# Patient Record
Sex: MALE
[De-identification: names, ages, dates, MRNs, and addresses within clinical notes are randomized per-mention and may not be internally consistent; named-entity substitution may affect disease eponyms.]

---

## 2018-09-04 ENCOUNTER — HOSPITAL ENCOUNTER (EMERGENCY)
Dept: HOSPITAL 97 - ER | Age: 43
Discharge: HOME | End: 2018-09-04
Payer: COMMERCIAL

## 2018-09-04 DIAGNOSIS — R53.1: Primary | ICD-10-CM

## 2018-09-04 DIAGNOSIS — E88.40: ICD-10-CM

## 2018-09-04 LAB
ALBUMIN SERPL BCP-MCNC: 3.3 G/DL (ref 3.4–5)
ALP SERPL-CCNC: 105 U/L (ref 45–117)
ALT SERPL W P-5'-P-CCNC: 24 U/L (ref 12–78)
AST SERPL W P-5'-P-CCNC: 15 U/L (ref 15–37)
BUN BLD-MCNC: 17 MG/DL (ref 7–18)
GLUCOSE SERPLBLD-MCNC: 158 MG/DL (ref 74–106)
HCT VFR BLD CALC: 36.2 % (ref 39.6–49)
INR BLD: 1.13
LYMPHOCYTES # SPEC AUTO: 1.2 K/UL (ref 0.7–4.9)
MCH RBC QN AUTO: 29.7 PG (ref 27–35)
MCV RBC: 84.6 FL (ref 80–100)
METHAMPHET UR QL SCN: NEGATIVE
PMV BLD: 8.3 FL (ref 7.6–11.3)
POTASSIUM SERPL-SCNC: 3.8 MMOL/L (ref 3.5–5.1)
RBC # BLD: 4.28 M/UL (ref 4.33–5.43)
THC SERPL-MCNC: NEGATIVE NG/ML
UA COMPLETE W REFLEX CULTURE PNL UR: (no result)

## 2018-09-04 PROCEDURE — 36415 COLL VENOUS BLD VENIPUNCTURE: CPT

## 2018-09-04 PROCEDURE — 93005 ELECTROCARDIOGRAM TRACING: CPT

## 2018-09-04 PROCEDURE — 99284 EMERGENCY DEPT VISIT MOD MDM: CPT

## 2018-09-04 PROCEDURE — 85730 THROMBOPLASTIN TIME PARTIAL: CPT

## 2018-09-04 PROCEDURE — 80048 BASIC METABOLIC PNL TOTAL CA: CPT

## 2018-09-04 PROCEDURE — 81003 URINALYSIS AUTO W/O SCOPE: CPT

## 2018-09-04 PROCEDURE — 85610 PROTHROMBIN TIME: CPT

## 2018-09-04 PROCEDURE — 70450 CT HEAD/BRAIN W/O DYE: CPT

## 2018-09-04 PROCEDURE — 80307 DRUG TEST PRSMV CHEM ANLYZR: CPT

## 2018-09-04 PROCEDURE — 80329 ANALGESICS NON-OPIOID 1 OR 2: CPT

## 2018-09-04 PROCEDURE — 80320 DRUG SCREEN QUANTALCOHOLS: CPT

## 2018-09-04 PROCEDURE — 85025 COMPLETE CBC W/AUTO DIFF WBC: CPT

## 2018-09-04 PROCEDURE — 81015 MICROSCOPIC EXAM OF URINE: CPT

## 2018-09-04 PROCEDURE — 80076 HEPATIC FUNCTION PANEL: CPT

## 2018-09-04 NOTE — ER
Nurse's Notes                                                                                     

 [facility]                                                                

Name: Faustino Aburto                                                                                

Age: 42 yrs                                                                                       

Sex: Male                                                                                         

: 1975                                                                                   

MRN: [id number]                                                                                   

Arrival Date: 2018                                                                          

Time: 11:28                                                                                       

Account#: [id number]                                                                            

Bed 27                                                                                            

Private MD:                                                                                       

Diagnosis: Weakness                                                                               

                                                                                                  

Presentation:                                                                                     

                                                                                             

11:32 Presenting complaint: EMS states: Home health says he just wasn't himself earlier but   dm5 

      he is more back to baseline at this time. Always weak and has a hard time walking           

      normally. Family wants blood work done and states he needs fluids. Pt finishing liter       

      bolus started in EMS. Transition of care: patient was not received from another setting     

      of care. Onset of symptoms was 2018. Risk Assessment: Do you want to hurt     

      yourself or someone else? Patient reports no desire to harm self or others. Initial         

      Sepsis Screen: Does the patient meet any 2 criteria? No. Patient's initial sepsis           

      screen is negative. Does the patient have a suspected source of infection? No.              

      Patient's initial sepsis screen is negative. Care prior to arrival: IV initiated. 18        

      GA, in the left forearm.                                                                    

11:32 Method Of Arrival: EMS: [facility]                                                       5 

11:32 Acuity: MIGUEL 3                                                                           dm5 

                                                                                                  

Triage Assessment:                                                                                

11:39 General: Appears in no apparent distress. Behavior is calm, cooperative, baseline per   dm5 

      family. Pain: Denies pain. Unable to use pain scale. only answers yes questions but         

      does not appear to be in pain at this time. Neuro: Level of Consciousness is awake,         

      obeys commands, confused. Respiratory: Airway is patent Respiratory effort is even,         

      unlabored, relaxed, Respiratory pattern is regular, symmetrical. GI: family reports         

      diarrhea yesterday. Pt vomited throw up last night 1 time. Derm: Skin is pink, warm \T\     

      dry.                                                                                        

                                                                                                  

Historical:                                                                                       

- Allergies:                                                                                      

11:37 No Known Allergies;                                                                     dm5 

- Home Meds:                                                                                      

11:37 Carbamazepine Oral [Active]; glimepiride Oral [Active]; argenine [Active];              dm5 

11:38 carnitine [Active];                                                                     dm5 

- PMHx:                                                                                           

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 

- PSHx:                                                                                           

11:37 Appendectomy;                                                                           dm5 

                                                                                                  

- Immunization history:: Adult Immunizations up to date.                                          

- Social history:: Smoking status: Patient/guardian denies using tobacco.                         

- Ebola Screening: : Patient negative for fever greater than or equal to 101.5 degrees            

  Fahrenheit, and additional compatible Ebola Virus Disease symptoms Patient denies               

  exposure to infectious person Patient denies travel to an Ebola-affected area in the            

  21 days before illness onset No symptoms or risks identified at this time.                      

                                                                                                  

                                                                                                  

Screenin:34 Abuse screen: Denies threats or abuse. Denies injuries from another. Nutritional        aj  

      screening: No deficits noted. Tuberculosis screening: No symptoms or risk factors           

      identified. Fall Risk None identified.                                                      

                                                                                                  

Assessment:                                                                                       

12:33 General: Appears in no apparent distress. comfortable, Behavior is calm, cooperative,   aj  

      appropriate for age. Pain: Denies pain. Neuro: Level of Consciousness is awake, obeys       

      commands, Oriented to WNL for patient. Neuro:  are equal bilaterally Moves all         

      extremities. Speech is normal, Facial symmetry appears normal. Respiratory: Airway is       

      patent Respiratory effort is even, unlabored, Respiratory pattern is regular,               

      symmetrical. GI: No signs and/or symptoms were reported involving the gastrointestinal      

      system. Derm: Skin is intact, is healthy with good turgor, Skin is pink, warm \T\ dry.      

      normal.                                                                                     

14:35 Reassessment: Patient appears in no apparent distress at this time. No changes from     aj  

      previously documented assessment. Patient and/or family updated on plan of care and         

      expected duration. Pain level reassessed. Patient is alert, oriented x 3, equal             

      unlabored respirations, skin warm/dry/pink. Caregiver is at bedside Patient states          

      feeling better. Patient states symptoms have improved.                                      

                                                                                                  

Vital Signs:                                                                                      

11:39  / 72; Pulse 83; Resp 24; Temp 99(O); Pulse Ox 100% on 2 lpm NC; Weight 65.77 kg  dm5 

      (R); Height 5 ft. 6 in. (167.64 cm) (R); Pain 0/10;                                         

11:43 Pulse Ox 94% on R/A;                                                                    dm5 

13:12  / 75; Pulse 77; Resp 16; Pulse Ox 99% on R/A;                                    aj  

13:41  / 73; Pulse 73; Resp 16; Pulse Ox 99% on R/A;                                    aj  

14:35  / 73; Pulse 76; Resp 18; Pulse Ox 99% on R/A;                                    aj  

11:39 Body Mass Index 23.40 (65.77 kg, 167.64 cm)                                             dm5 

                                                                                                  

ED Course:                                                                                        

11:28 Patient arrived in ED.                                                                  ss  

11:28 Ashutosh Bower NP is PHCP.                                                           pm1 

11:28 Indra Holm MD is Attending Physician.                                             pm1 

11:34 Triage completed.                                                                       dm5 

11:43 Arm band placed on Patient placed in an exam room, on a stretcher, on cardiac monitor,  dm5 

      on pulse oximetry.                                                                          

12:00 Patient moved to CT via stretcher.                                                      sj  

12:05 CT Head Brain wo Cont In Process Unspecified.                                           EDMS

12:33 Mera Dick, RN is Primary Nurse.                                                     aj  

12:34 Patient has correct armband on for positive identification. Placed in gown. Bed in low  aj  

      position. Call light in reach. Side rails up X 1. Adult w/ patient.                         

12:52 EKG done, by EKG tech. reviewed by Ashutosh Bower NP.                                  3 

14:35 No provider procedures requiring assistance completed. IV discontinued, intact,         aj  

      bleeding controlled, No redness/swelling at site. Pressure dressing applied, EMS 18 G       

      to left AC.                                                                                 

                                                                                                  

Administered Medications:                                                                         

No medications were administered                                                                  

                                                                                                  

                                                                                                  

Outcome:                                                                                          

14:21 Discharge ordered by MD.                                                                pm1 

14:35 Discharged to home ambulatory, with family.                                             aj  

14:35 Condition: good                                                                             

14:35 Discharge instructions given to patient, family, Instructed on discharge instructions,      

      follow up and referral plans. Demonstrated understanding of instructions, follow-up         

      care.                                                                                       

14:37 Patient left the ED.                                                                    aj  

                                                                                                  

Signatures:                                                                                       

Dispatcher [employer]                           TRISTAMS                                                 

Iman Gonzalez RN RN   dmMera Aj RN RN aj Jones, Susan sj Smirch, Shelby, RN RN                                                      

Ashutosh Bower NP                    NP   pm1                                                  

Sakshi Patricio                              3                                                  

                                                                                                  

**************************************************************************************************

## 2018-09-04 NOTE — EDPHYS
Physician Documentation                                                                           

 [facility]                                                                

Name: Faustino Aburto                                                                                

Age: 42 yrs                                                                                       

Sex: Male                                                                                         

: 1975                                                                                   

MRN: [id number]                                                                                   

Arrival Date: 2018                                                                          

Time: 11:28                                                                                       

Account#: [id number]                                                                            

Bed 27                                                                                            

Private MD:                                                                                       

ED Physician Indra Holm                                                                      

HPI:                                                                                              

                                                                                             

14:00 This 42 yrs old  Male presents to ER via EMS with complaints of General         pm1 

      Weakness.                                                                                   

14:00 The patient presents to the emergency department with weakness of the entire body,      pm1 

      generalized weakness. Onset: The symptoms/episode began/occurred this morning. Context:     

      occurred at home, occurred while the patient was doing normal activity. Associated          

      signs and symptoms: Pertinent negatives: fever, headache, syncope, near-syncope.            

      Severity of symptoms: in the emergency department the symptoms. Patient's baseline:         

      Neuro: orientated to person, place, Motor: at his current generalized weakness              

      baseline, Ambulation: walks without assistance, The patient has a previous history of       

      mitochondrial disease. Current symptoms: Currently, the patient is not experiencing any     

      symptoms, the patient feels back to baseline, according to sister who lives with. The       

      patient has not recently seen a physician. 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      

      mitochondrial disease. Patient was not doing his normal routine according to his sister     

      and is currently acting within his normal limits. Patient has mild mental retardation,      

      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       

                                                                                                  

Historical:                                                                                       

- Allergies:                                                                                      

11:37 No Known Allergies;                                                                     dm5 

- Home Meds:                                                                                      

11:37 Carbamazepine Oral [Active]; glimepiride Oral [Active]; argenine [Active];              dm5 

11:38 carnitine [Active];                                                                     dm5 

- PMHx:                                                                                           

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 

- PSHx:                                                                                           

11:37 Appendectomy;                                                                           dm5 

                                                                                                  

- Immunization history:: Adult Immunizations up to date.                                          

- Social history:: Smoking status: Patient/guardian denies using tobacco.                         

- Ebola Screening: : Patient negative for fever greater than or equal to 101.5 degrees            

  Fahrenheit, and additional compatible Ebola Virus Disease symptoms Patient denies               

  exposure to infectious person Patient denies travel to an Ebola-affected area in the            

  21 days before illness onset No symptoms or risks identified at this time.                      

                                                                                                  

                                                                                                  

ROS:                                                                                              

14:00 Constitutional: Negative for fever, chills, and weight loss, Eyes: Negative for injury, pm1 

      pain, redness, and discharge, ENT: Negative for injury, pain, and discharge, Neck:          

      Negative for injury, pain, and swelling, Cardiovascular: Negative for chest pain,           

      palpitations, and edema, Respiratory: Negative for shortness of breath, cough,              

      wheezing, and pleuritic chest pain, Abdomen/GI: Negative for abdominal pain, nausea,        

      vomiting, diarrhea, and constipation, Back: Negative for injury and pain, : Negative      

      for injury, bleeding, discharge, and swelling, MS/Extremity: Negative for injury and        

      deformity, Skin: Negative for injury, rash, and discoloration.                              

14:00 Neuro: Positive for generalized weakness, Negative for altered mental status, headache,     

      loss of consciousness, seizure activity, syncope, near syncope.                             

                                                                                                  

Exam:                                                                                             

14:00 Constitutional:  This is a well developed, well nourished patient who is awake, alert,  pm1 

      and in no acute distress. Head/Face:  Normocephalic, atraumatic. Eyes:  Pupils equal        

      round and reactive to light, extra-ocular motions intact.  Lids and lashes normal.          

      Conjunctiva and sclera are non-icteric and not injected.  Cornea within normal limits.      

      Periorbital areas with no swelling, redness, or edema. ENT:  Nares patent. No nasal         

      discharge, no septal abnormalities noted.  Tympanic membranes are normal and external       

      auditory canals are clear.  Oropharynx with no redness, swelling, or masses, exudates,      

      or evidence of obstruction, uvula midline.  Mucous membranes moist. Neck:  Trachea          

      midline, no thyromegaly or masses palpated, and no cervical lymphadenopathy.  Supple,       

      full range of motion without nuchal rigidity, or vertebral point tenderness.  No            

      Meningismus. Chest/axilla:  Normal chest wall appearance and motion.  Nontender with no     

      deformity.  No lesions are appreciated. Cardiovascular:  Regular rate and rhythm with a     

      normal S1 and S2.  No gallops, murmurs, or rubs.  Normal PMI, no JVD.  No pulse             

      deficits. Respiratory:  Lungs have equal breath sounds bilaterally, clear to                

      auscultation and percussion.  No rales, rhonchi or wheezes noted.  No increased work of     

      breathing, no retractions or nasal flaring. Abdomen/GI:  Soft, non-tender, with normal      

      bowel sounds.  No distension or tympany.  No guarding or rebound.  No evidence of           

      tenderness throughout. Back:  No spinal tenderness.  No costovertebral tenderness.          

      Full range of motion. Skin:  Warm, dry with normal turgor.  Normal color with no            

      rashes, no lesions, and no evidence of cellulitis. MS/ Extremity:  Pulses equal, no         

      cyanosis.  Neurovascular intact.  Full, normal range of motion.                             

14:00 Neuro: Orientation: unable to test, mitochondrial disease, Mentation: baseline              

      according to sister at bedside, Motor: moves all fours, strength is 5/5 in all              

      extremities, Sensation: is normal, no obvious gross deficits.                               

                                                                                                  

Vital Signs:                                                                                      

11:39  / 72; Pulse 83; Resp 24; Temp 99(O); Pulse Ox 100% on 2 lpm NC; Weight 65.77 kg  dm5 

      (R); Height 5 ft. 6 in. (167.64 cm) (R); Pain 0/10;                                         

11:43 Pulse Ox 94% on R/A;                                                                    dm5 

13:12  / 75; Pulse 77; Resp 16; Pulse Ox 99% on R/A;                                    aj  

13:41  / 73; Pulse 73; Resp 16; Pulse Ox 99% on R/A;                                    aj  

14:35  / 73; Pulse 76; Resp 18; Pulse Ox 99% on R/A;                                    aj  

11:39 Body Mass Index 23.40 (65.77 kg, 167.64 cm)                                             dm5 

                                                                                                  

MDM:                                                                                              

11:28 Patient medically screened.                                                             kwan 

14:20 Data reviewed: vital signs. Data interpreted: Pulse oximetry: on room air is 99 %.      pm1 

      Interpretation: normal. Counseling: I had a detailed discussion with the patient and/or     

      guardian regarding: the historical points, exam findings, and any diagnostic results        

      supporting the discharge/admit diagnosis, lab results, radiology results, the need for      

      outpatient follow up, to return to the emergency department if symptoms worsen or           

      persist or if there are any questions or concerns that arise at home.                       

                                                                                                  

                                                                                             

11:39 Order name: Acetaminophen; Complete Time: 14:                                         pm1 

                                                                                             

11:39 Order name: Basic Metabolic Panel; Complete Time: 14:                                 pm                                                                                             

11:39 Order name: CBC with Diff; Complete Time: 14:                                         pm1 

                                                                                             

11:39 Order name: ETOH Level; Complete Time: 14:                                            pm1 

                                                                                             

11:39 Order name: Hepatic Function; Complete Time: 14:                                      pm                                                                                             

11:39 Order name: PT-INR; Complete Time: 14:09                                                pm1 

                                                                                             

11:35 Order name: CT Head Brain wo Cont; Complete Time: 12:22                                 pm1 

                                                                                             

11:39 Order name: Ptt, Activated; Complete Time: 14:09                                        pm                                                                                             

11:39 Order name: Salicylate; Complete Time: 14:09                                            pm1 

                                                                                             

11:39 Order name: Urine Drug Screen; Complete Time: 14:09                                     pm                                                                                             

11:39 Order name: EKG; Complete Time: 11:40                                                   pm1 

                                                                                             

11:39 Order name: EKG - Nurse/Tech; Complete Time: 13:26                                      pm1 

                                                                                             

11:39 Order name: Urine Microscopic Only; Complete Time: 14:09                                pm                                                                                             

13:21 Order name: Urine Dipstick--Ancillary (enter results); Complete Time: 14:09             eb  

                                                                                             

11:39 Order name: IV Saline Lock; Complete Time: 13:19                                        pm1 

                                                                                             

11:39 Order name: Labs collected and sent; Complete Time: 13:19                               pm1 

                                                                                             

11:39 Order name: Urine Dipstick-Ancillary (obtain specimen); Complete Time: 13:19            pm1 

                                                                                                  

Administered Medications:                                                                         

No medications were administered                                                                  

                                                                                                  

                                                                                                  

Disposition:                                                                                      

                                                                                             

07:18 Co-signature as Attending Physician, Indra Holm MD I agree with the assessment and  kwan 

      plan of care.                                                                               

                                                                                                  

Disposition:                                                                                      

18 14:21 Discharged to Home. Impression: Weakness.                                          

- Condition is Stable.                                                                            

- Discharge Instructions: Weakness.                                                               

                                                                                                  

- Medication Reconciliation Form, Thank You Letter, Antibiotic Education, Prescription            

  Opioid Use form.                                                                                

- Follow up: Emergency Department; When: As needed; Reason: Worsening of condition.               

  Follow up: Private Physician; When: 2 - 3 days; Reason: Recheck today's complaints,             

  Continuance of care, Re-evaluation by your physician.                                           

- Problem is new.                                                                                 

- Symptoms have improved.                                                                         

                                                                                                  

                                                                                                  

                                                                                                  

Signatures:                                                                                       

Dispatcher [employer]                           Iman Pfeiffer, Mera Mckeon RN, RN RN aj Anderson, Corey, MD MD cha Marinas, Patrick, NP                    NP   pm1                                                  

                                                                                                  

Corrections: (The following items were deleted from the chart)                                    

                                                                                             

14:37 14:21 2018 14:21 Discharged to Home. Impression: Weakness. Condition is Stable.   aj  

      Forms are Medication Reconciliation Form, Thank You Letter, Antibiotic Education,           

      Prescription Opioid Use. Follow up: Emergency Department; When: As needed; Reason:          

      Worsening of condition. Follow up: Private Physician; When: 2 - 3 days; Reason: Recheck     

      today's complaints, Continuance of care, Re-evaluation by your physician. Problem is        

      new. Symptoms have improved. pm1                                                            

                                                                                                  

**************************************************************************************************

## 2018-09-04 NOTE — RAD REPORT
EXAM DESCRIPTION:  CT - Head Brain Wo Cont - 9/4/2018 12:04 pm

 

CLINICAL HISTORY:  MENTAL STATUS CHANGE

Drowsiness

 

COMPARISON:  No comparisons

 

TECHNIQUE:  All CT scans are performed using dose optimization technique as appropriate and may inclu
de automated exposure control or mA/KV adjustment according to patient size.

 

FINDINGS:  No intracranial hemorrhage, hydrocephalus or extra-axial fluid collection.Large area of gl
iosis is seen in the left cerebral hemisphere likely related to prior infarction or trauma.Prominent 
brain atrophy pattern with chronic microvascular ischemic changes seen. No midline shift.

 

The paranasal sinuses and mastoids are clear. The calvarium is intact.

 

IMPRESSION:  No acute intracranial abnormality.

 

Prominent atrophy pattern noted, greater than typically seen in this age group.

## 2018-09-05 NOTE — EKG
Test Date:    2018-09-04               Test Time:    12:50:32

Technician:   BARRERA                                     

                                                     

MEASUREMENT RESULTS:                                       

Intervals:                                           

Rate:         77                                     

MT:           120                                    

QRSD:         84                                     

QT:           402                                    

QTc:          454                                    

Axis:                                                

P:            49                                     

MT:           120                                    

QRS:          47                                     

T:            152                                    

                                                     

INTERPRETIVE STATEMENTS:                                       

                                                     

Normal sinus rhythm

nst

Abnormal ECG

Compared to ECG 01/17/2008 20:00:45



Sinus tachycardia no longer present

T-wave abnormality still present



Electronically Signed On 09-05-18 04:17:27 CDT by Chava Osullivan

## 2022-06-22 ENCOUNTER — HOSPITAL ENCOUNTER (INPATIENT)
Dept: HOSPITAL 97 - ER | Age: 47
LOS: 6 days | Discharge: HOME HEALTH | DRG: 177 | End: 2022-06-28
Attending: INTERNAL MEDICINE | Admitting: INTERNAL MEDICINE
Payer: COMMERCIAL

## 2022-06-22 VITALS — BODY MASS INDEX: 24.2 KG/M2

## 2022-06-22 DIAGNOSIS — Z86.73: ICD-10-CM

## 2022-06-22 DIAGNOSIS — Z78.1: ICD-10-CM

## 2022-06-22 DIAGNOSIS — R47.01: ICD-10-CM

## 2022-06-22 DIAGNOSIS — U07.1: Primary | ICD-10-CM

## 2022-06-22 DIAGNOSIS — G93.41: ICD-10-CM

## 2022-06-22 DIAGNOSIS — G40.909: ICD-10-CM

## 2022-06-22 DIAGNOSIS — E88.41: ICD-10-CM

## 2022-06-22 DIAGNOSIS — E87.6: ICD-10-CM

## 2022-06-22 LAB
ALBUMIN SERPL BCP-MCNC: 3.6 G/DL (ref 3.4–5)
ALP SERPL-CCNC: 104 U/L (ref 45–117)
ALT SERPL W P-5'-P-CCNC: 19 U/L (ref 12–78)
AST SERPL W P-5'-P-CCNC: 12 U/L (ref 15–37)
BUN BLD-MCNC: 16 MG/DL (ref 7–18)
GLUCOSE SERPLBLD-MCNC: 116 MG/DL (ref 74–106)
HCT VFR BLD CALC: 37.4 % (ref 39.6–49)
INR BLD: 1.12
LYMPHOCYTES # SPEC AUTO: 1.6 K/UL (ref 0.7–4.9)
MCV RBC: 85 FL (ref 80–100)
METHAMPHET UR QL SCN: NEGATIVE
PMV BLD: 7.3 FL (ref 7.6–11.3)
POTASSIUM SERPL-SCNC: 4.2 MMOL/L (ref 3.5–5.1)
RBC # BLD: 4.39 M/UL (ref 4.33–5.43)
THC SERPL-MCNC: NEGATIVE NG/ML
TSH SERPL DL<=0.05 MIU/L-ACNC: 1.22 UIU/ML (ref 0.36–3.74)

## 2022-06-22 PROCEDURE — 80053 COMPREHEN METABOLIC PANEL: CPT

## 2022-06-22 PROCEDURE — 71045 X-RAY EXAM CHEST 1 VIEW: CPT

## 2022-06-22 PROCEDURE — 99285 EMERGENCY DEPT VISIT HI MDM: CPT

## 2022-06-22 PROCEDURE — 82550 ASSAY OF CK (CPK): CPT

## 2022-06-22 PROCEDURE — 93005 ELECTROCARDIOGRAM TRACING: CPT

## 2022-06-22 PROCEDURE — 80061 LIPID PANEL: CPT

## 2022-06-22 PROCEDURE — 97165 OT EVAL LOW COMPLEX 30 MIN: CPT

## 2022-06-22 PROCEDURE — 70551 MRI BRAIN STEM W/O DYE: CPT

## 2022-06-22 PROCEDURE — 81003 URINALYSIS AUTO W/O SCOPE: CPT

## 2022-06-22 PROCEDURE — 97116 GAIT TRAINING THERAPY: CPT

## 2022-06-22 PROCEDURE — 85610 PROTHROMBIN TIME: CPT

## 2022-06-22 PROCEDURE — 82947 ASSAY GLUCOSE BLOOD QUANT: CPT

## 2022-06-22 PROCEDURE — 80307 DRUG TEST PRSMV CHEM ANLYZR: CPT

## 2022-06-22 PROCEDURE — 84146 ASSAY OF PROLACTIN: CPT

## 2022-06-22 PROCEDURE — 80048 BASIC METABOLIC PNL TOTAL CA: CPT

## 2022-06-22 PROCEDURE — 83605 ASSAY OF LACTIC ACID: CPT

## 2022-06-22 PROCEDURE — 97161 PT EVAL LOW COMPLEX 20 MIN: CPT

## 2022-06-22 PROCEDURE — 84443 ASSAY THYROID STIM HORMONE: CPT

## 2022-06-22 PROCEDURE — 85730 THROMBOPLASTIN TIME PARTIAL: CPT

## 2022-06-22 PROCEDURE — 36415 COLL VENOUS BLD VENIPUNCTURE: CPT

## 2022-06-22 PROCEDURE — 93971 EXTREMITY STUDY: CPT

## 2022-06-22 PROCEDURE — 80156 ASSAY CARBAMAZEPINE TOTAL: CPT

## 2022-06-22 PROCEDURE — 85025 COMPLETE CBC W/AUTO DIFF WBC: CPT

## 2022-06-22 PROCEDURE — 97530 THERAPEUTIC ACTIVITIES: CPT

## 2022-06-22 PROCEDURE — 80076 HEPATIC FUNCTION PANEL: CPT

## 2022-06-22 PROCEDURE — 83090 ASSAY OF HOMOCYSTEINE: CPT

## 2022-06-22 PROCEDURE — 70450 CT HEAD/BRAIN W/O DYE: CPT

## 2022-06-22 RX ADMIN — DEXTROSE AND SODIUM CHLORIDE SCH MLS: 5; .9 INJECTION, SOLUTION INTRAVENOUS at 21:57

## 2022-06-22 RX ADMIN — Medication SCH ML: at 21:57

## 2022-06-22 NOTE — EDPHYS
Physician Documentation                                                                           

 [facility]                                                                 

Name: Faustino Aburto                                                                                

Age: 46 yrs                                                                                       

Sex: Male                                                                                         

: 1975                                                                                   

MRN: [id number]                                                                                   

Arrival Date: 2022                                                                          

Time: 13:42                                                                                       

Account#: [id number]                                                                            

Bed 3                                                                                             

Private MD:                                                                                       

ED Physician Yung Hernandez                                                                         

HPI:                                                                                              

                                                                                             

18:43 This 46 yrs old  Male presents to ER via EMS with complaints of Altered Mental  rn  

      Status, Slurred Speech.                                                                     

18:43 The patient presents with confusion. Onset: The symptoms/episode began/occurred at an   rn  

      unknown time. Possible causes: unknown. Current symptoms: In the emergency department       

      the patient's symptoms are unchanged from the initial presentation. The patient has         

      experienced a previous episode. The patient has not recently seen a physician. EMS          

      reports last known normal last night, family saw him this AM and was not at baseline,       

      seemed confused, more slurred speech than normal, agitated. NO seizures. Has MELAS.         

      Takes carbamazepine for seizures. .                                                         

                                                                                                  

Historical:                                                                                       

- Allergies:                                                                                      

13:42 No Known Allergies;                                                                     aa5 

- PMHx:                                                                                           

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 

13:54 MELAS;                                                                                  aa5 

14:00 Expressive and receptive aphasia;                                                       aa5 

                                                                                                  

- Immunization history:: Adult Immunizations unknown.                                             

- Social history:: Smoking status: unknown.                                                       

- Family history:: not pertinent.                                                                 

- Hospitalizations: : No recent hospitalization is reported.                                      

                                                                                                  

                                                                                                  

ROS:                                                                                              

18:43 Unable to obtain ROS due to altered mental status.                                      rn  

                                                                                                  

Exam:                                                                                             

18:43 Constitutional:  This is a well developed, well nourished patient who is awake, alert,  rn  

      slight agitation Head/Face:  Normocephalic, atraumatic. Eyes:  Periorbital areas with       

      no swelling, redness, or edema. ENT:  dry MM Cardiovascular:  Regular rate and rhythm.      

      No pulse deficits. Respiratory:  No increased work of breathing, no retractions or          

      nasal flaring. Abdomen/GI:  Soft, non-tender Skin:  Warm, dry MS/ Extremity:  Pulses        

      equal, no cyanosis.   Neuro:  Awake alert, moderate slurred speech, moves all 4             

      extremities with equal strength and to command. Oriented to person, not place or time,      

      repetetive speech.                                                                          

                                                                                                  

Vital Signs:                                                                                      

13:42  / 83; Pulse 74; Resp 16; Temp 98.4(A); Pulse Ox 100% ;                           aa5 

15:00  / 78; Pulse 74; Resp 18 S; Temp 98.3(A); Pulse Ox 100% on R/A;                   aa5 

16:30  / 85; Pulse 74; Resp 16 S; Pulse Ox 99% on R/A;                                  aa5 

19:16  / 96; Pulse 73; Resp 18 S; Pulse Ox 100% on R/A;                                 lg3 

                                                                                                  

NIH Stroke Scale Scores:                                                                          

13:42 NIHSS Score: 9                                                                          aa5 

                                                                                                  

MDM:                                                                                              

13:42 Patient medically screened.                                                             rn  

                                                                                                  

                                                                                             

13:44 Order name: Basic Metabolic Panel; Complete Time: 15:32                                 rn  

                                                                                             

13:44 Order name: CBC with Diff; Complete Time: 14:30                                         rn  

                                                                                             

13:44 Order name: CPK; Complete Time: 15:32                                                   rn  

                                                                                             

13:44 Order name: Hepatic Function; Complete Time: 15:32                                      rn  

                                                                                             

13:44 Order name: Protime (+inr); Complete Time: 14:49                                        rn  

                                                                                             

13:44 Order name: Ptt, Activated; Complete Time: 14:49                                        rn  

                                                                                             

13:44 Order name: Lactate; Complete Time: 15:32                                               rn  

                                                                                             

14:29 Order name: Glucose, Ancillary Testing; Complete Time: 14:30                            [facility]

                                                                                             

14:55 Order name: SARS-COV-2 RT PCR (Document "Date of Onset" if Symptomatic)                   

                                                                                             

15:35 Order name: Urine Drug Screen                                                           rn  

                                                                                             

16:51 Order name: Prolactin                                                                   EDMS

                                                                                             

16:51 Order name: Creatine Phosphokinase                                                      EDMS

                                                                                             

16:51 Order name: Homocysteine                                                                EDMS

                                                                                             

16:51 Order name: Thyroid Stimulating Hormone                                                 EDMS

                                                                                             

13:44 Order name: CT Stroke Brain w/o Contrast; Complete Time: 14:30                          rn  

                                                                                             

13:44 Order name: Stroke CXR 1 View; Complete Time: 14:49                                     rn  

                                                                                             

16:51 Order name: CBC with Automated Diff                                                     EDMS

                                                                                             

16:51 Order name: CBC with Automated Diff                                                     EDMS

                                                                                             

16:51 Order name: Comprehensive Metabolic Panel                                               EDMS

                                                                                             

16:51 Order name: Comprehensive Metabolic Panel                                               EDMS

                                                                                             

16:51 Order name: Comprehensive Metabolic Panel                                               EDMS

                                                                                             

16:51 Order name: Comprehensive Metabolic Panel                                               EDMS

                                                                                             

16:51 Order name: Lipid Profile                                                               EDMS

                                                                                             

16:51 Order name: Lipid Profile                                                               EDMS

                                                                                             

16:51 Order name: Brain Wo Cont                                                               EDMS

                                                                                             

16:53 Order name: Carbamazepine (Tegretol) Level                                              EDMS

                                                                                             

19:02 Order name: Urine Dipstick-Ancillary                                                    EDMS

                                                                                             

19:43 Order name: Lactate Sepsis 2 HR Follow-up                                               EDMS

                                                                                             

13:44 Order name: EKG; Complete Time: 13:44                                                   rn  

                                                                                             

13:44 Order name: Accucheck; Complete Time: 14:30                                             rn  

                                                                                             

13:44 Order name: Cardiac monitoring; Complete Time: 14:30                                    rn  

                                                                                             

13:44 Order name: EKG - Nurse/Tech; Complete Time: 14:30                                      rn  

                                                                                             

13:44 Order name: IV Saline Lock; Complete Time: 14:30                                        rn  

                                                                                             

13:44 Order name: Labs collected and sent; Complete Time: 14:30                               rn  

                                                                                             

13:44 Order name: NPO; Complete Time: 13:47                                                   rn  

                                                                                             

13:44 Order name: O2 Per Protocol; Complete Time: 13:46                                       rn  

                                                                                             

13:44 Order name: O2 Sat Monitoring; Complete Time: 13:46                                     rn  

                                                                                             

13:44 Order name: Stroke Swallow Screen; Complete Time: 15:01                                 rn  

                                                                                             

16:51 Order name: CONS Physician Consult                                                      EDMS

                                                                                             

16:51 Order name: Clear Liquid; Complete Time: 19:16                                          EDMS

                                                                                                  

Administered Medications:                                                                         

14:45 Drug: NS 0.9% 1000 ml Route: IV; Rate: 1000 ml; Site: right antecubital;                aa5 

15:31 Drug: NS 0.9% 1000 ml Route: IV; Rate: 1000 ml; Site: right antecubital;                aa5 

                                                                                                  

                                                                                                  

Point of Care Testing:                                                                            

      Blood Glucose:                                                                              

14:20 Blood Glucose: 123 mg/dL;                                                               aa5 

      Ranges:                                                                                     

      Critical Glucose Levels:Adult <50 mg/dl or >400 mg/dl  <40 mg/dl or >180 mg/dl       

Disposition Summary:                                                                              

22 15:36                                                                                    

Hospitalization Ordered                                                                           

      Hospitalization Status: Inpatient Admission                                             rn  

      Provider: Krysta Graham                                                               rn  

      Location: Tele[facility] (Inpatient)                                                 rn  

      Condition: Stable                                                                       rn  

      Problem: new                                                                            rn  

      Symptoms: are unchanged                                                                 rn  

      Bed/Room Type: Standard                                                                 rn  

      Room Assignment: 223(22 18:19)                                                    bd  

      Diagnosis                                                                                   

        - Altered mental status, unspecified                                                  [facility]                                                                               rn  

      Forms:                                                                                      

        - Medication Reconciliation Form                                                      rn  

        - SBAR form                                                                           rn  

                                                                                                  

NIH Stroke Scale - NIH Stroke Score                                                               

Date: 2022                                                                                  

Time: 13:42                                                                                       

Total Score = 9                                                                                   

  1a. Level of Consciousness (LOC) - 0(Alert)                                                     

  1b. Level of Consciousness (LOC) (Month \T\ Age) - 2(Neither)                                   

  1c. LOC Commands (Open \T\ Closes Eyes/) - 2(Neither)                                       

   2. Best Gaze (Lateral Gaze Paresis) - 0(Normal)                                                

   3. Visual Field Loss - 0(No visual loss)                                                       

   4. Facial Palsy - 0(Normal)                                                                    

  5a. Left Arm: Motor (10-second hold) - 0(No drift)                                              

  5b. Right Arm: Motor (10-second hold) - 1(Drift)                                                

  6a. Left Leg: Motor (5-second hold - always test supine) - 0(No drift)                          

  6b. Right Leg: Motor (5-second hold - always test supine) - 0(No drift)                         

   7. Limb Ataxia (finger/nose \T\ heel/shin - test with eyes open) - 0(Absent)                   

   8. Sensory Loss (pinprick arms/legs/face) - 0(Normal)                                          

   9. Best Language: Aphasia (description/naming/reading) - 2(Severe aphasia)                     

  10. Dysarthria (speech clarity - read or repeat words) - 2(Severe)                              

  11. Extinction and Inattention (visual/tactile/auditory/spatial/personal) - 0(No                

      abnormality)                                                                                

Initials: aa5                                                                                     

                                                                                                  

Signatures:                                                                                       

Dispatcher [employer]                           Cha Anderson Roman, MD MD rn Calderon, Audri, RN RN   aa5                                                  

                                                                                                  

Corrections: (The following items were deleted from the chart)                                    

13:54 13:42 PMHx: JESSICA; fazal                                                          aa5         

18:19 15:36 rn                                                                        precious          

                                                                                                  

**************************************************************************************************

## 2022-06-22 NOTE — P.HP
Certification for Inpatient


Patient will require the following post-hospital care: None


Practitioner: I am a practitioner with admitting privileges, knowledge of 

patient current condition, hospital course, and medical plan of care.


Services: Services provided to patient in accordance with Admission requirements

found in Title 42 Section 412.3 of the Code of Federal Regulations





Patient History


Date of Service: 06/23/22


Reason for admission: Altered mental status


History of Present Illness: 





46-year-old male with past medical history of MELAS syndrome, seizure disorder, 

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 

onset confusion this morning.  Patient who reside with his niece was noted by 

the niece to be having difficulty with speaking on waking up this morning.  

Patient was also dropping things and having disorganized movements which is 

unlike his baseline.  Niece reports that patient did have some weight behavior 

yesterday but he typically gets tired.  She has brought him to the ED today 

because of worsening confusion.  She reports at baseline he is able to do all 

activities of daily living as well as instrumental activities of daily living 

without any assist.  On presentation in the ED patient had normal blood 

pressure, he was noted to have both expressive and receptive aphasia.  Head CT 

shows remote old left MCA infarct but otherwise no acute intracranial pathology.

 His WBC and his BMP were normal.  He had mild elevated lactic acid of 2.4.  


At the time of interview patient is unable to give any history.  He has some 

poor words vocalization but unable to follow commands.  Niece present at bedside

helping with history.  No reported new fever or chills.  No reported new 

contact.  No new diarrhea chest pain or dizziness


Allergies





iodine Allergy (Verified 03/05/13 20:47)


   Hives


phenytoin sodium [From Dilantin] Allergy (Verified 03/05/13 20:47)


   Hives


phenytoin sodium extended [From Dilantin] Allergy (Verified 03/05/13 20:47)


   Hives








- Past Medical/Surgical History


-: Multiple CVAs2 episode in his 30s, last episode was 4 years ago


-: Seizure disorder, none since the last 5 years


-: None





- Family History


  ** Father


-: Other (see notes)


Notes: Strong family history of MELAS syndrome with multiple members of the 

family with CVA





- Social History


Smoking Status: Never smoker


Smoking therapy provided: No


Alcohol use: No


CD- Drugs: No


Caffeine use: No


Place of Residence: Home





Review of Systems


is unable to be obtained





Physical Examination





- Physical Exam


General: Alert, Confused, Delirious, Other (confused and moving )


HEENT: Atraumatic, Normocephalic


Neck: Supple, 2+ carotid pulse no bruit, JVD not distended


Respiratory: Clear to auscultation bilaterally, Normal air movement


Cardiovascular: Normal pulses, Regular rate/rhythm, Normal S1 S2


Gastrointestinal: Normal bowel sounds, Soft and benign, Non-distended


Musculoskeletal: No clubbing, No swelling, No contractures


Integumentary: No rashes, No breakdown


Neurological: Normal tone, Abnormal speech, Abnormal cranial nerve function





- Studies


Laboratory Data (last 24 hrs)





06/22/22 14:14: PT 12.4, INR 1.12, APTT 33.5


06/22/22 14:14: WBC 9.5, Hgb 12.3 L, Hct 37.4 L, Plt Count 199


06/22/22 14:14: Sodium 137, Potassium 4.2, BUN 16, Creatinine 0.68, Glucose 116 

H, Total Bilirubin 0.2, AST 12 L, ALT 19, Alkaline Phosphatase 104








Assessment and Plan





- Problems (Diagnosis)


(1) MELAS (mitochondrial encephalopathy, lactic acidosis and stroke-like 

episodes)


Current Visit: Yes   Status: Acute   


Discharge Plan: Home


Plan to discharge in: 48 Hours





- Advance Directives


Does patient have a Living Will: No


Does patient have a Durable POA for Healthcare: Yes





- Code Status/Comfort Care


Code Status Assessed: Yes


Code Status: Full Code


Physician Review: Patient Assessed, Agree with Above Assessment and Plan


Physician Review Additional Text: 





Impression


Acute metabolic encephalopathy


Rule out new CVA


History of MELAS syndrome


Elevated lactic aciddue to Syndrome








Plan


We will admit patient to observation


Start gentle IV fluid hydration


We will start some sedative with Ativan to control patient agitation and 

confused status


Formal neurology consult


Obtain blood culture x2 although likely mild elevated lactic acid may be due to 

MELAS syndrome


As needed Geodon as needed


MRI brain in a.m.


Obtain urine drug screen


Obtain carbamazepine levelneeds report patient is on carbamazepine


May need echo and carotid Doppler if evidence of new CVA


Will monitor closely


Neurochecks every 4


Time Spent Managing Pts Care (In Minutes): 70

## 2022-06-22 NOTE — RAD REPORT
EXAM DESCRIPTION:  CT - Ct Stroke Brain Wo Cont - 6/22/2022 2:06 pm

 

CLINICAL HISTORY:  MELAS, AMS

 

COMPARISON:  Head Brain Wo Cont dated 9/4/2018

 

TECHNIQUE:  All CT scans are performed using dose optimization technique as appropriate and may inclu
de automated exposure control or mA/KV adjustment according to patient size.

 

FINDINGS:  No intracranial hemorrhage, hydrocephalus or extra-axial fluid collection.No areas of brai
n edema or evidence of midline shift. Large remote left MCA territory infarct. Age advanced cerebral 
atrophy. Chronic small vessel ischemic changes. Bilateral basal ganglia mineralization.

 

Trace maxillary sinus thickening. The calvarium is intact.

 

IMPRESSION:  No acute intracranial abnormality.  Large remote left MCA territory infarct.

## 2022-06-22 NOTE — ER
Nurse's Notes                                                                                     

 [facility] [place]                                                                 

Name: Faustino Aburto                                                                                

Age: 46 yrs                                                                                       

Sex: Male                                                                                         

: 1975                                                                                   

MRN: [id number]                                                                                   

Arrival Date: 2022                                                                          

Time: 13:42                                                                                       

Account#: [id number]                                                                            

Bed 3                                                                                             

Private MD:                                                                                       

Diagnosis: Altered mental status, unspecified;MELAS                                               

                                                                                                  

Presentation:                                                                                     

                                                                                             

13:42 Coronavirus screen: At this time, the client does not indicate any symptoms associated  aa5 

      with coronavirus-19. Ebola Screen: Patient denies travel to an Ebola-affected area in       

      the 21 days before illness onset. Initial Sepsis Screen: Does the patient meet any 2        

      criteria? No. Patient's initial sepsis screen is negative. Does the patient have a          

      suspected source of infection? No. Patient's initial sepsis screen is negative. Risk        

      Assessment: Do you want to hurt yourself or someone else? Unable to obtain. Onset of        

      symptoms was 2022.                                                                 

13:42 Acuity: MIGUEL 2                                                                           aa5 

13:42 Method Of Arrival: EMS: [facility]                                                       aa5 

13:42 Chief complaint: EMS states: family reported speech difficulty, pt is normally able to  aa5 

      speak short sentences with some aphasia. Last known normal was last night.                  

13:42 An acute neurological deficit is present. Pre-hospital glucose is not applicable to     aa5 

      this patient.                                                                               

                                                                                                  

Triage Assessment:                                                                                

13:42 The onset of the patients symptoms was more than six hours ago.                         aa5 

                                                                                                  

Stroke Activation: Symptom onset > 6 hours                                                        

 Physician: Stroke Attending; Name: ; Notified At: ; Arrived At:                                  

 Physician: Chief Stroke Resident; Name: ; Notified At: ; Arrived At:                             

 Physician: Stroke Resident; Name: ; Notified At: ; Arrived At:                                   

 Physician: ED Attending; Name: ; Notified At: ; Arrived At:                                      

 Physician: ED Resident; Name: ; Notified At: ; Arrived At:                                       

                                                                                                  

Historical:                                                                                       

- Allergies:                                                                                      

13:42 No Known Allergies;                                                                     aa5 

- PMHx:                                                                                           

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 

13:54 MELAS;                                                                                  aa5 

14:00 Expressive and receptive aphasia;                                                       aa5 

                                                                                                  

- Immunization history:: Adult Immunizations unknown.                                             

- Social history:: Smoking status: unknown.                                                       

- Family history:: not pertinent.                                                                 

- Hospitalizations: : No recent hospitalization is reported.                                      

                                                                                                  

                                                                                                  

Screenin:36 Abuse screen: No signs of abuse noted. Nutritional screening: No deficits noted.        aa5 

      Tuberculosis screening: No symptoms or risk factors identified. Fall Risk Secondary         

      diagnosis (15 points) CVA, IV access (20 points). Mental Status- Overestimates/Forgets      

      Limitations (15 pts.). Total Gonzales Fall Scale indicates High Risk Score (45 or more         

      points). Fall prevention measures have been instituted. Side Rails Up X 2 Placed Close      

      to Nursing Station Family Present and informed to notify staff if the need to leave the     

      bedside.                                                                                    

                                                                                                  

Assessment:                                                                                       

13:42 General: Appears comfortable, Behavior is calm, cooperative. Pain: Unable to use pain   aa5 

      scale. FLACC scale score is 0 out of 10. Neuro: Level of Consciousness is awake,            

      confused, Pt only able to follow some commands . Oriented to person,  are unknown,     

      pt unable to follow command to . . Weakness in right arm(s) Speech is slurred, with     

      expressive aphasia noted, Facial symmetry appears normal, PERRL. Cardiovascular: Heart      

      tones S1 S2 present Rhythm is regular. Respiratory: Airway is patent Respiratory effort     

      is even, unlabored, Respiratory pattern is regular, symmetrical, Breath sounds are          

      clear bilaterally. GI: Abdomen is round non-distended, Bowel sounds present X 4 quads.      

      Abd is soft X 4 quads. : No signs and/or symptoms were reported regarding the             

      genitourinary system. EENT: hearing aid noted to left ear . Derm: Skin is dry, Skin is      

      normal, Skin temperature is warm. Musculoskeletal: Range of motion: intact in all           

      extremities.                                                                                

13:42 VAN Scoring: Arm Drift: Minor drift Visual Disturbance: No visual disturbance noted.    aa5 

      Aphasia: Patient exhibits both expressive and receptive aphasia. Provider notified of       

      +VAN scoring. Neglect: No neglect noted.                                                    

13:42 T-PA (Activase) Screening: Contraindications: Patient reports onset of signs and        aa5 

      symptoms of stroke greater than 6 hours ago: Yes.                                           

13:42 Patient has been NPO before screening. The patient is alert, and able to follow         aa5 

      commands. The patient exhibits slurred or garbled speech. Provider notified of the          

      indication for Speech Therapy consult. The patient failed the bedside swallow               

      screening. The patient will be kept NPO until cleared by Speech Therapy or Physician.       

      Provider notified of bedside swallow screening results: Yung Hernandez MD.                     

13:42 The patient is exhibiting difficulty speaking. The patient is exhibiting difficulty     aa5 

      understanding words. The patient is able to swallow own secretions with no drooling or      

      need for suction. not completed not completed.                                              

14:00 Reassessment: Pt's niece (caretaker) at bedside. Pt's niece reports slurred speech and  aa5 

      states "he was dropping things this morning and he normally doesn't do that, he is          

      right handed and dropped his coffee cup this morning" .                                     

14:10 Reassessment: Back from CT, x-ray at bedside .                                          aa5 

14:45 Reassessment: Pt not responding to name, pt with eyes open staring into space, right    aa5 

      side of face twitching and mild facial droop noted, MD was notified and episode             

      witnessed by physician, MD currently speaking to pt's niece (caregiver). Episode lasted     

      approximately 30 to 45 seconds. Pt now awake and responding to name, slurred speech,        

      receptive/expressive aphasia noted, right arm weakness and drift, no facial droop noted     

      at this time. .                                                                             

15:00 Reassessment: Pt sitting up in bed. . Neuro: Level of Consciousness is awake, Oriented  aa5 

      to person,  are unknown, pt not following commands to  . Weakness in right         

      arm(s) Speech is slurred, with expressive aphasia noted, Facial symmetry appears            

      normal, PERRL. Cardiovascular: Rhythm is sinus rhythm. Respiratory: Airway is patent        

      Respiratory effort is even, unlabored, Respiratory pattern is regular, symmetrical.         

      Derm: Skin is dry, Skin is normal, Skin temperature is warm.                                

15:30 Reassessment: Pt resting in bed with eyes closed, respirations even and unlabored. .    aa5 

16:30 Reassessment: Pt resting in bed with eyes closed, easy to awaken to verbal stimuli.     aa5 

      Speech remains incomprehensible and slurred. Awaiting room assignment. .                    

17:30 Reassessment: Pt resting in bed with eyes closed, easy to awaken to verbal stimuli.     aa5 

      Cleaned of urinary incontinence. .                                                          

19:17 General: Appears in no apparent distress. comfortable, Behavior is calm, cooperative.   lg3 

      Pain: Unable to use pain scale. FLACC scale score is 0 out of 10. Neuro: Level of           

      Consciousness is awake, confused, Oriented to person, Weakness Speech is slurred, with      

      expressive aphasia noted, Facial symmetry appears normal. Cardiovascular: No deficits       

      noted. Heart tones S1 S2 present Rhythm is sinus rhythm. Respiratory: No deficits           

      noted. Airway is patent Respiratory effort is even, unlabored, Respiratory pattern is       

      regular, symmetrical. GI: No deficits noted. Abdomen is round non-distended, Bowel          

      sounds present X 4 quads. Abd is soft X 4 quads. : No signs and/or symptoms were          

      reported regarding the genitourinary system. EENT: hearing aid noted to left ear. Derm:     

      No deficits noted. Skin is dry, Skin is normal, Skin temperature is warm.                   

      Musculoskeletal: Range of motion: intact in all extremities.                                

20:15 Reassessment: Pt taken to MRI via stretcher, will go to 2nd floor after MRI is complete.vc1 

                                                                                                  

Vital Signs:                                                                                      

13:42  / 83; Pulse 74; Resp 16; Temp 98.4(A); Pulse Ox 100% ;                           aa5 

15:00  / 78; Pulse 74; Resp 18 S; Temp 98.3(A); Pulse Ox 100% on R/A;                   aa5 

16:30  / 85; Pulse 74; Resp 16 S; Pulse Ox 99% on R/A;                                  aa5 

19:16  / 96; Pulse 73; Resp 18 S; Pulse Ox 100% on R/A;                                 lg3 

                                                                                                  

NIH Stroke Scale Scores:                                                                          

13:42 NIHSS Score: 9                                                                          aa5 

                                                                                                  

ED Course:                                                                                        

13:42 Patient arrived in ED.                                                                  rn  

13:42 Yung Hernandez MD is Attending Physician.                                                rn  

13:42 Arm band placed on.                                                                     aa5 

13:42 Patient has correct armband on for positive identification. Placed in gown. Bed in low  aa5 

      position. Call light in reach. Side rails up X2. Client placed on continuous cardiac        

      and pulse oximetry monitoring. NIBP monitoring applied.                                     

13:46 Megha Chavez, RN is Primary Nurse.                                                   aa5 

13:48 Triage completed.                                                                       aa5 

14:07 CT Stroke Brain w/o Contrast In Process Unspecified.                                    EDMS

14:15 Initial lab(s) drawn, by me, sent to lab. Inserted saline lock: 20 gauge in right       aa5 

      antecubital area, using aseptic technique. Blood collected.                                 

14:22 Stroke CXR 1 View In Process Unspecified.                                               EDMS

15:35 Krysta Graham MD is Hospitalizing Provider.                                          rn  

15:45 COVID swab sent to lab.                                                                 aa5 

19:00 Report given to GUI Wilson.                                                              aa5 

19:16 Carbamazepine (Tegretol) Level Sent.                                                    lg3 

19:16 Lipid Profile Sent.                                                                     lg3 

20:13 No provider procedures requiring assistance completed. Patient admitted, IV remains in  vc1 

      place.                                                                                      

                                                                                                  

Administered Medications:                                                                         

14:45 Drug: NS 0.9% 1000 ml Route: IV; Rate: 1000 ml; Site: right antecubital;                aa5 

15:31 Drug: NS 0.9% 1000 ml Route: IV; Rate: 1000 ml; Site: right antecubital;                aa5 

                                                                                                  

                                                                                                  

Medication:                                                                                       

19:17 VIS not applicable for this client.                                                     lg3 

                                                                                                  

Point of Care Testing:                                                                            

      Blood Glucose:                                                                              

14:20 Blood Glucose: 123 mg/dL;                                                               aa5 

      Ranges:                                                                                     

                                                                                                  

Outcome:                                                                                          

15:36 Decision to Hospitalize by Provider.                                                    rn  

20:14 Admitted to Med/surg via stretcher, room 223, Report called to  Zachery. GUI             vc1 

20:14 Condition: good                                                                             

20:14 Instructed on the need for admit.                                                           

20:15 Patient left the ED.                                                                    vc1 

                                                                                                  

                                                                                                  

NIH Stroke Scale - NIH Stroke Score                                                               

Date: 2022                                                                                  

Time: 13:42                                                                                       

Total Score = 9                                                                                   

  1a. Level of Consciousness (LOC) - 0(Alert)                                                     

  1b. Level of Consciousness (LOC) (Month \T\ Age) - 2(Neither)                                   

  1c. LOC Commands (Open \T\ Closes Eyes/) - 2(Neither)                                       

   2. Best Gaze (Lateral Gaze Paresis) - 0(Normal)                                                

   3. Visual Field Loss - 0(No visual loss)                                                       

   4. Facial Palsy - 0(Normal)                                                                    

  5a. Left Arm: Motor (10-second hold) - 0(No drift)                                              

  5b. Right Arm: Motor (10-second hold) - 1(Drift)                                                

  6a. Left Leg: Motor (5-second hold - always test supine) - 0(No drift)                          

  6b. Right Leg: Motor (5-second hold - always test supine) - 0(No drift)                         

   7. Limb Ataxia (finger/nose \T\ heel/shin - test with eyes open) - 0(Absent)                   

   8. Sensory Loss (pinprick arms/legs/face) - 0(Normal)                                          

   9. Best Language: Aphasia (description/naming/reading) - 2(Severe aphasia)                     

  10. Dysarthria (speech clarity - read or repeat words) - 2(Severe)                              

  11. Extinction and Inattention (visual/tactile/auditory/spatial/personal) - 0(No                

      abnormality)                                                                                

Initials: aa5                                                                                     

                                                                                                  

Signatures:                                                                                       

Dispatcher [employer]                           EDYung Adam MD MD   rn                                                   

Megha Chavez RN                     RN   aa5                                                  

Katie Flores RN                       RN   lg3                                                  

Yasmeen Garcia RN                    RN   vc1                                                  

                                                                                                  

Corrections: (The following items were deleted from the chart)                                    

13:54 13:42 PMHx: MILAS; aa5                                                          aa5         

14:36 14:00 Reassessment: Pt's niece (caretaker) at bedside . aa5                     aa5         

                                                                                                  

**************************************************************************************************

## 2022-06-22 NOTE — RAD REPORT
EXAM DESCRIPTION:  RAD - Chest Single View - 6/22/2022 2:20 pm

 

CLINICAL HISTORY:  AMS

 

COMPARISON:  CHEST SINGLE VIEW dated 7/28/2010

 

FINDINGS:  Lines: None.

Lungs: Decreased lung volumes with mild basilar opacities.

Pleural: No significant pleural effusions or pneumothorax.

Cardiac: Similar size and configuration.

Bones: No acute fractures.

Other:

 

IMPRESSION:  Decreased lung volumes with mild basilar opacities favored represent atelectasis.

## 2022-06-22 NOTE — XMS REPORT
Continuity of Care Document

                            Created on:2022



Patient:ELLIOT SANFORD

Sex:Male

:1975

External Reference #:[id number]





Demographics







                          Address                   [address]



                                                    [place] [address]

 

                          Home Phone                [phone]

 

                          Work Phone                [phone]

 

                          Email Address             NONE

 

                          Preferred Language        Unknown

 

                          Marital Status            Unknown

 

                          Yazidi Affiliation     Unknown

 

                          Race                      Unknown

 

                          Additional Race(s)        Unavailable

 

                          Ethnic Group              Unknown









Author







                          Organization              [facility]

t

 

                          Address                   [address] [place] [address] [address]



                                                    [place] [address]

 

                          Phone                     [phone]









Care Team Providers







                    Name                Role                Phone

 

                    Unavailable         Unavailable         Unavailable









Payers







           Payer Name Policy Type Policy Number Effective Date Expiration Date S

isai

 

           MEDICARE PART A \T\            [id number] 2008            



           B                                00:00:00              

 

           [facility]            [id number]  2019            



           MEDICAID                         00:00:00              







Problems

This patient has no known problems.



Allergies, Adverse Reactions, Alerts







       Allergy Allergy Status Severity Reaction(s) Onset  Inactive Treating Comm

ents 

Source



       Name   Type                        Date   Date   Clinician        

 

       IODINATE Drug   Active        Rash   -0                      [facility]



       D      Class                       3-11                        ity of



       CONTRAST                             00:00:                      [address]

 

       PHENYTOI DRUG   Active        Hives  -0                      [facility]



       N      INGREDI                      5-13                        [facility] of



                                          00:00:                      [address]







Medications

This patient has no known medications.



Procedures

This patient has no known procedures.



Encounters







        Start   End     Encounter Admission Attending Care    Care    Encounter 

Source



        Date/Time Date/Time Type    Type    Clinicians Facility Department ID   

   

 

        2021         Emergency                 [facility]    [id number] 

[facility]



        15:56:24                                                         ity of



                                                                        [facility]







Results

This patient has no known results.

## 2022-06-22 NOTE — RAD REPORT
EXAM DESCRIPTION:  MRI - Brain Wo Cont - 6/22/2022 8:36 pm

 

CLINICAL HISTORY:  New onset confusion, rule out new CVA

 

COMPARISON:  No comparisons

 

TECHNIQUE:  Sagittal T1-weighted images were obtained along with PD/heavily T2-weighted and T2-FLAIR 
images. Axial DWI and ADC mapping sequences were also obtained along with coronal heavily T2-weighted
 images were obtained.

 

FINDINGS:  Large remote left primarily MCA territory infarct in the left cerebral hemisphere. Remote 
small right frontotemporal infarct. Moderate chronic small vessel ischemic changes. No new acute infa
rct is identified. No mass effect or midline shift. Major intravascular flow voids are present. No hy
drocephalus. There is ex vacuo dilatation of the left lateral ventricle as result of the infarcts.

 

Mastoid air cells and paranasal sinuses are clear.

 

IMPRESSION:  No acute intracranial abnormality. Specifically, no evidence of acute infarct. Remote bi
lateral cerebral infarcts, large on the left and small on the right. Severe age advanced cerebral atr
ophy.

## 2022-06-23 LAB
ALBUMIN SERPL BCP-MCNC: 3.2 G/DL (ref 3.4–5)
ALP SERPL-CCNC: 106 U/L (ref 45–117)
ALT SERPL W P-5'-P-CCNC: 17 U/L (ref 12–78)
AST SERPL W P-5'-P-CCNC: 21 U/L (ref 15–37)
BUN BLD-MCNC: 9 MG/DL (ref 7–18)
GLUCOSE SERPLBLD-MCNC: 141 MG/DL (ref 74–106)
HCT VFR BLD CALC: 36.2 % (ref 39.6–49)
HDLC SERPL-MCNC: 39 MG/DL (ref 40–60)
LDLC SERPL CALC-MCNC: 147 MG/DL (ref ?–130)
LYMPHOCYTES # SPEC AUTO: 1.4 K/UL (ref 0.7–4.9)
MCV RBC: 84.1 FL (ref 80–100)
PMV BLD: 8 FL (ref 7.6–11.3)
POTASSIUM SERPL-SCNC: 3.9 MMOL/L (ref 3.5–5.1)
RBC # BLD: 4.31 M/UL (ref 4.33–5.43)

## 2022-06-23 RX ADMIN — DEXTROSE AND SODIUM CHLORIDE SCH MLS: 5; .9 INJECTION, SOLUTION INTRAVENOUS at 09:17

## 2022-06-23 RX ADMIN — Medication SCH ML: at 21:22

## 2022-06-23 RX ADMIN — QUETIAPINE SCH: 25 TABLET ORAL at 21:00

## 2022-06-23 RX ADMIN — DEXTROSE AND SODIUM CHLORIDE SCH MLS: 5; .9 INJECTION, SOLUTION INTRAVENOUS at 18:27

## 2022-06-23 RX ADMIN — Medication SCH ML: at 09:17

## 2022-06-23 NOTE — P.PN
Subjective


Date of Service: 06/23/22


Chief Complaint: Altered mental status


Subjective: New changes (Overnight agitated, given Ativan Geodon Sedated and 

drowsy this morning On wrist restraints)





Physical Examination





- Vital Signs


Temperature: 97.3 F


Blood Pressure: 179/80


Pulse: 74


Respirations: 18


Pulse Ox (%): 100





- Studies


Laboratory Data (last 24 hrs)





06/22/22 14:14: PT 12.4, INR 1.12, APTT 33.5


06/22/22 14:14: Sodium 137, Potassium 4.2, BUN 16, Creatinine 0.68, Glucose 116 

H, Total Bilirubin 0.2, AST 12 L, ALT 19, Alkaline Phosphatase 104








Assessment And Plan





- Current Problems (Diagnosis)


(1) MELAS (mitochondrial encephalopathy, lactic acidosis and stroke-like 

episodes)


Current Visit: Yes   Status: Acute   


Physician Review: Patient Assessed, Agree with Above Assessment and Plan


Physician Review Additional Text: 





Physical Exam





General: Confused, Delirious, drowsy++


HEENT: Atraumatic, Normocephalic


Neck: Supple, 2+ carotid pulse no bruit, JVD not distended


Respiratory: CTA b/l 


Cardiovascular: Normal pulses, Regular rate/rhythm, Normal S1 S2


Gastrointestinal: Normal bowel sounds, Soft and benign, Non-distended


Musculoskeletal: No clubbing,  No contractures


Integumentary: No rashes, No breakdown


Neurological: Normal tone, Abnormal speech, Abnormal cranial nerve function





MRI of the brainremote large infarct, no new acute infarct or abnormality





COVID screenpositive





Impression


Acute metabolic encephalopathylikely due to new onset COVID with worsening 

encephalopathy of MELAS syndrome


History of MELAS syndrome


Elevated lactic aciddue to Syndrome


COVID infection





Plan





Will switch patient to inpatient status


Continue IVF


Continue Geodon and Ativan for mood control


Will add low-dose Seroquel at night


Resume patient carbamazepine


Follow formal neurology consult


May need SNF or COVID unit since encephalopathy may need a couple of more days 

to weeks.


Familyniece POA discussed with today 


Subcutaneous Lovenox for DVT prophylaxis


Continue close monitoring


Time Spent Managing PTS Care (In Minutes): 30

## 2022-06-23 NOTE — CON
Reason For Consultation:  Consultation called because of altered mental status.



History Of Present Illness:  Mr. Aburto is a 46-year-old patient with history of 

MELAS, seizures, multiple strokes, who at baseline is fairly independent, able to ambulate short dist
ances, and take care of his activities of daily living while living with his niece.  He reportedly be
came more confused, unable to speak and communicate, and could not bear weight and do his usual activ
ities.  At [facility], he was found to be unable to communicate in any meaningful way.  His 
head imaging by CT scan revealed large remote left MCA territory infarct and advanced global cerebral
 atrophy with bilateral basal ganglia mineralization.  Brain MRI showed a large remote left MCA infar
ct involving the left cerebral hemisphere and remote small right frontotemporal infarct and moderate 
chronic small vessel ischemic disease with severe advanced cerebral atrophy.  There is ex vacuo dilat
ation of the left lateral ventricle as a result of the multiple infarctions; however, no acute infarc
tion was identified.  His white blood cell count initially was normal, then showed to be 11.8 today w
ith 80.9% neutrophils, hemoglobin 12.3.  INR 1.12.  Chemistries unremarkable.  Glucose ranged up to 1
41.  Calcium slightly low at 8.1.  Liver function studies unremarkable.  LDL was elevated to 147, HDL
 low at 39, total cholesterol 208.  He does have procalcitonin pending.  Lactic acid normal at 2.0.  
His drug screen was negative.  COVID-19 testing is positive.



Past Medical History:  As noted above, including seizures.  Reported the last was 5 years ago.



Allergies:  PHENYTOIN AND IODINE.



Family History:  Multiple family members with MELAS and strokes.



Social History:  No alcohol, tobacco, or IV drug use.



Review of Systems:

The patient at this point is not responsive to verbal stimulation.  Barely responsive to tactile stim
ulation in the extremities.



Physical Examination:

Vital Signs:  Blood pressure 158/74, pulse 74, respiratory rate 18, temperature 97.5, oxygen saturati
on 100%.  Weight 150 pounds, height 5 feet 6 inches, BMI 24.2. 

General:  Mr. Aburto is resting in isolation room.  He appears to be in no acute distress.  He is br
eathing spontaneously on his own. 

Neurological:  He is not responding to verbal stimulation.  Tactile stimulation may elicit slight mov
ement in a focal manner, but much less so on the right versus left side.  Tone appears to be normal.



Assessment:  Mr. Aburto is a 46-year-old patient with likely a COVID-aggravated encephalopathy, give
n also multiple chronic strokes involving large left middle cerebral artery territory, which likely p
roduces significant aphasia and likely be global.  Given his multiple comorbid conditions and mitocho
ndrial encephalopathy, lactic acidosis, and stroke-like episodes, he does have a significant morbidit
y and potential, which may be very difficult to recover from.  At this stage, supportive care should 
be continued, but the family should be prepared that the patient may not do very well in the future, 
especially with the COVID infection.  His chest x-ray did show decreased lung volume with mild basila
r opacities, which did represent likely atelectasis.  However, a chest CT scan may be helpful in ruli
ng out the involvement of the lungs with COVID.  An arterial blood gas also may be very helpful.  His
 carbamazepine level, which the patient has been on for a while for seizures and has not had seizures
 for a long time, was 9.7, which is therapeutic.



Plan:  

1.Continue with the carbamazepine.

2.EEG should be done.

3.Chest CT done.

4.The patient's family should be told of his serious condition, and 

may be prepared for him not to be recovering very well.  In any event, continue aggressive and suppor
tive care at this point.





JITENDRA/RAIN

DD:  06/23/2022 18:55:30Voice ID:  [id number]

DT:  06/23/2022 23:34:14Report ID:  [id number]

## 2022-06-24 LAB
ALBUMIN SERPL BCP-MCNC: 3.1 G/DL (ref 3.4–5)
ALP SERPL-CCNC: 109 U/L (ref 45–117)
ALT SERPL W P-5'-P-CCNC: 23 U/L (ref 12–78)
AST SERPL W P-5'-P-CCNC: 53 U/L (ref 15–37)
BUN BLD-MCNC: 6 MG/DL (ref 7–18)
GLUCOSE SERPLBLD-MCNC: 179 MG/DL (ref 74–106)
HCT VFR BLD CALC: 37.8 % (ref 39.6–49)
LYMPHOCYTES # SPEC AUTO: 1.6 K/UL (ref 0.7–4.9)
MCV RBC: 84.4 FL (ref 80–100)
PMV BLD: 7.8 FL (ref 7.6–11.3)
POTASSIUM SERPL-SCNC: 3.6 MMOL/L (ref 3.5–5.1)
RBC # BLD: 4.48 M/UL (ref 4.33–5.43)

## 2022-06-24 RX ADMIN — QUETIAPINE SCH MG: 25 TABLET ORAL at 22:03

## 2022-06-24 RX ADMIN — DEXTROSE AND SODIUM CHLORIDE SCH MLS: 5; .9 INJECTION, SOLUTION INTRAVENOUS at 04:00

## 2022-06-24 RX ADMIN — DEXTROSE AND SODIUM CHLORIDE SCH: 5; .9 INJECTION, SOLUTION INTRAVENOUS at 09:00

## 2022-06-24 RX ADMIN — DEXTROSE AND SODIUM CHLORIDE SCH MLS: 5; .9 INJECTION, SOLUTION INTRAVENOUS at 13:51

## 2022-06-24 RX ADMIN — Medication SCH: at 09:00

## 2022-06-24 RX ADMIN — Medication SCH ML: at 21:00

## 2022-06-24 NOTE — P.PN
Subjective


Date of Service: 06/24/22


Chief Complaint: Altered mental status


Subjective: Improving (More conversant today Less agitated overnight)





Physical Examination





- Vital Signs


Temperature: 970.0 F


Blood Pressure: 112/68


Pulse: 70


Respirations: 16


Pulse Ox (%): 100





Assessment And Plan





- Current Problems (Diagnosis)


(1) MELAS (mitochondrial encephalopathy, lactic acidosis and stroke-like 

episodes)


Current Visit: Yes   Status: Acute   


Physician Review: Patient Assessed, Agree with Above Assessment and Plan


Physician Review Additional Text: 





Impression


COVID infection


Acute metabolic encephalopathydue to COVID induced worsening of MELAS syndrome


History of MELAS syndrome


Elevated lactic aciddue to Syndrome








Plan


Clinically improving


Continue hydration


Continue added Seroquel


Continue as needed Ativan if agitation


No longer needing wrist restraints, continue to follow


MRI negative for acute infarct.


Continue COVID isolation


Add PT and OT


Plan for discharge home when more conversant and returned to baseline mental 

status





Time Spent Managing PTS Care (In Minutes): 35

## 2022-06-25 LAB
ALBUMIN SERPL BCP-MCNC: 2.9 G/DL (ref 3.4–5)
ALP SERPL-CCNC: 103 U/L (ref 45–117)
ALT SERPL W P-5'-P-CCNC: 37 U/L (ref 12–78)
AST SERPL W P-5'-P-CCNC: 66 U/L (ref 15–37)
BUN BLD-MCNC: 7 MG/DL (ref 7–18)
GLUCOSE SERPLBLD-MCNC: 164 MG/DL (ref 74–106)
HCT VFR BLD CALC: 34.5 % (ref 39.6–49)
LYMPHOCYTES # SPEC AUTO: 2.4 K/UL (ref 0.7–4.9)
MCV RBC: 84.6 FL (ref 80–100)
PMV BLD: 7.5 FL (ref 7.6–11.3)
POTASSIUM SERPL-SCNC: 3.3 MMOL/L (ref 3.5–5.1)
RBC # BLD: 4.08 M/UL (ref 4.33–5.43)

## 2022-06-25 RX ADMIN — QUETIAPINE SCH MG: 25 TABLET ORAL at 20:48

## 2022-06-25 RX ADMIN — DEXTROSE AND SODIUM CHLORIDE SCH MLS: 5; .9 INJECTION, SOLUTION INTRAVENOUS at 11:57

## 2022-06-25 RX ADMIN — DEXTROSE AND SODIUM CHLORIDE SCH MLS: 5; .9 INJECTION, SOLUTION INTRAVENOUS at 05:00

## 2022-06-25 RX ADMIN — Medication SCH ML: at 20:48

## 2022-06-25 RX ADMIN — Medication SCH ML: at 09:00

## 2022-06-25 NOTE — EKG
Test Date:    2022-06-22               Test Time:    14:20:09

Technician:   WILLIAM                                    

                                                     

MEASUREMENT RESULTS:                                       

Intervals:                                           

Rate:         71                                     

NH:           114                                    

QRSD:         80                                     

QT:           398                                    

QTc:          432                                    

Axis:                                                

P:            46                                     

NH:           114                                    

QRS:          38                                     

T:            -7                                     

                                                     

INTERPRETIVE STATEMENTS:                                       

                                                     

Normal sinus rhythm

Possible Left atrial enlargement

Nonspecific T wave abnormality

Abnormal ECG

Compared to ECG 09/04/2018 12:50:32

T-wave abnormality now present



Electronically Signed On 06-25-22 17:32:03 CDT by Guru Zhao

## 2022-06-25 NOTE — P.PN
Subjective


Date of Service: 06/25/22


Chief Complaint: Altered mental status


Subjective: No new changes





Physical Examination





- Vital Signs


Temperature: 98.2 F


Blood Pressure: 110/67


Pulse: 67


Respirations: 16


Pulse Ox (%): 97





- Physical Exam


General: Alert, Oriented x3


HEENT: Atraumatic, Normocephalic


Neck: Supple


Respiratory: Normal air movement


Cardiovascular: Regular rate/rhythm, Normal S1 S2


Gastrointestinal: Soft and benign


Musculoskeletal: No swelling


Neurological: Normal speech





Assessment And Plan





- Plan


 Impression:


COVID infection


Acute metabolic encephalopathydue to COVID induced worsening of MELAS syndrome


History of MELAS syndrome


Elevated lactic aciddue to Syndrome


Hypokalemia





Plan


Clinically improving


Continue hydration


Continue added Seroquel


Continue as needed Ativan if agitation.


Potassium is low at 3.3, we will replete and follow closely.


We will continue PT and OT


Plan for discharge home when more conversant and returned to baseline mental 

status





Physician Review: Patient Assessed, Agree with Above Assessment and Plan

## 2022-06-26 LAB
ALBUMIN SERPL BCP-MCNC: 3.1 G/DL (ref 3.4–5)
ALP SERPL-CCNC: 113 U/L (ref 45–117)
ALT SERPL W P-5'-P-CCNC: 71 U/L (ref 12–78)
AST SERPL W P-5'-P-CCNC: 51 U/L (ref 15–37)
BUN BLD-MCNC: 6 MG/DL (ref 7–18)
GLUCOSE SERPLBLD-MCNC: 183 MG/DL (ref 74–106)
HCT VFR BLD CALC: 34.8 % (ref 39.6–49)
LYMPHOCYTES # SPEC AUTO: 2.1 K/UL (ref 0.7–4.9)
MCV RBC: 84.2 FL (ref 80–100)
PMV BLD: 7.9 FL (ref 7.6–11.3)
POTASSIUM SERPL-SCNC: 3.5 MMOL/L (ref 3.5–5.1)
RBC # BLD: 4.13 M/UL (ref 4.33–5.43)

## 2022-06-26 RX ADMIN — DEXTROSE AND SODIUM CHLORIDE SCH MLS: 5; .9 INJECTION, SOLUTION INTRAVENOUS at 08:37

## 2022-06-26 RX ADMIN — DEXTROSE AND SODIUM CHLORIDE SCH MLS: 5; .9 INJECTION, SOLUTION INTRAVENOUS at 01:00

## 2022-06-26 RX ADMIN — Medication SCH ML: at 21:00

## 2022-06-26 RX ADMIN — DEXTROSE AND SODIUM CHLORIDE SCH MLS: 5; .9 INJECTION, SOLUTION INTRAVENOUS at 21:00

## 2022-06-26 RX ADMIN — Medication SCH ML: at 08:39

## 2022-06-26 RX ADMIN — QUETIAPINE SCH MG: 25 TABLET ORAL at 21:20

## 2022-06-26 NOTE — P.PN
Subjective


Date of Service: 06/26/22


Chief Complaint: Altered mental status


Subjective: No new changes





Physical Examination





- Vital Signs


Temperature: 97.6 F


Blood Pressure: 121/67


Pulse: 73


Respirations: 18


Pulse Ox (%): 97





- Physical Exam


General: Alert


HEENT: Atraumatic, Normocephalic


Neck: Supple


Respiratory: Normal air movement


Cardiovascular: Regular rate/rhythm, Normal S1 S2


Gastrointestinal: Soft and benign





Assessment And Plan





- Plan


 Impression:


COVID infection


Acute metabolic encephalopathydue to COVID induced worsening of MELAS syndrome


History of MELAS syndrome


Elevated lactic aciddue to Syndrome


Hypokalemia











Plan


Clinically improving.


Continue hydration.


Continue added Seroquel.


Continue as needed Ativan for episode of agitation.


Potassium is low at 3.3on last check. awaiting labs today, we will replete and 

follow closely.


We will continue PT and OT.


Plan for discharge home when more conversant and returned to baseline mental 

status





Physician Review: Patient Assessed, Agree with Above Assessment and Plan

## 2022-06-27 LAB
ALBUMIN SERPL BCP-MCNC: 3.1 G/DL (ref 3.4–5)
ALP SERPL-CCNC: 114 U/L (ref 45–117)
ALT SERPL W P-5'-P-CCNC: 66 U/L (ref 12–78)
AST SERPL W P-5'-P-CCNC: 36 U/L (ref 15–37)
BUN BLD-MCNC: 6 MG/DL (ref 7–18)
GLUCOSE SERPLBLD-MCNC: 145 MG/DL (ref 74–106)
POTASSIUM SERPL-SCNC: 3.6 MMOL/L (ref 3.5–5.1)

## 2022-06-27 RX ADMIN — DEXTROSE AND SODIUM CHLORIDE SCH: 5; .9 INJECTION, SOLUTION INTRAVENOUS at 17:00

## 2022-06-27 RX ADMIN — DEXTROSE AND SODIUM CHLORIDE SCH MLS: 5; .9 INJECTION, SOLUTION INTRAVENOUS at 08:36

## 2022-06-27 RX ADMIN — Medication SCH: at 21:00

## 2022-06-27 RX ADMIN — Medication SCH ML: at 08:44

## 2022-06-27 RX ADMIN — QUETIAPINE SCH MG: 25 TABLET ORAL at 22:11

## 2022-06-27 NOTE — P.PN
Date of Service: 06/27/22





Subjective


Subjective: 


No new changes; patient back to baseline according to family member.  They do 

want home health arranged at discharge.  Will try to get this set up.  

Anticipate discharge in the morning.





Physical Examination





- Vital Signs


 reviewed





- Physical Exam


General: Alert


HEENT: Atraumatic, Normocephalic


Neck: Supple


Respiratory: Normal air movement


Cardiovascular: Regular rate/rhythm, Normal S1 S2


Gastrointestinal: Soft and benign





Assessment And Plan





- Plan


 Impression:


COVID infection


Acute metabolic encephalopathydue to COVID induced worsening of MELAS syndrome


History of MELAS syndrome


Elevated lactic aciddue to Syndrome


Hypokalemia











Plan


Clinically improving.


Continue hydration.


Potassium -will replete and follow closely.


We will continue PT and OT.


Plan for discharge home when more conversant and returned to baseline mental 

status





Physician Review: Patient Assessed, Agree with Above Assessment and Plan

## 2022-06-28 VITALS — TEMPERATURE: 97.6 F

## 2022-06-28 VITALS — DIASTOLIC BLOOD PRESSURE: 76 MMHG | SYSTOLIC BLOOD PRESSURE: 116 MMHG

## 2022-06-28 VITALS — OXYGEN SATURATION: 96 %

## 2022-06-28 RX ADMIN — Medication SCH ML: at 09:14

## 2022-06-28 RX ADMIN — DEXTROSE AND SODIUM CHLORIDE SCH MLS: 5; .9 INJECTION, SOLUTION INTRAVENOUS at 02:18

## 2022-06-28 NOTE — P.DS
Discharge Date: 06/28/22


Disposition: ROUTINE DISCHARGE


Discharge Condition: GOOD


Reason for Admission: Altered mental status


Brief History of Present Illness: 








46-year-old male with past medical history of MELAS syndrome, seizure disorder, 

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 

onset confusion this morning.  Patient who reside with his niece was noted by 

the niece to be having difficulty with speaking on waking up this morning.  

Patient was also dropping things and having disorganized movements which is 

unlike his baseline.  Niece reports that patient did have some weight behavior 

yesterday but he typically gets tired.  She has brought him to the ED today 

because of worsening confusion.  She reports at baseline he is able to do all 

activities of daily living as well as instrumental activities of daily living 

without any assist.  On presentation in the ED patient had normal blood 

pressure, he was noted to have both expressive and receptive aphasia.  Head CT 

shows remote old left MCA infarct but otherwise no acute intracranial pathology.

 His WBC and his BMP were normal.  He had mild elevated lactic acid of 2.4.  


At the time of interview patient is unable to give any history.  He has some 

poor words vocalization but unable to follow commands.  Niece present at bedside

helping with history.  No reported new fever or chills.  No reported new 

contact.  No new diarrhea chest pain or dizziness


Hospital Course: 





Patient is interacting appropriately.  Spoke with patient's daughter who states 

that he is pretty much back to his baseline.  She is okay with him going home at

this time.  He did have some drooling for which we have added scopolamine.  I 

did ask her to hold off on the Seroquel for the time being dependent on his 

mentation.  Long-term wise a prognosis is poor.  He does have a lot of old 

strokes but we did not see any new strokes on this admission.  Patient overall 

is doing better and he will be discharged with family.  He may need hospice care

in the near future if his condition worsens.


Vital Signs/Physical Exam: 














Temp Pulse Resp BP Pulse Ox


 


 97.6 F   66   16   116/76   100 


 


 06/28/22 11:00  06/28/22 11:00  06/28/22 11:00  06/28/22 11:00  06/28/22 11:00








General: Alert, In no apparent distress, Oriented x3, Oriented x2


Laboratory Data at Discharge: 














WBC  7.7 K/uL (4.3-10.9)   06/26/22  20:42    


 


Hgb  11.6 g/dL (13.6-17.9)  L  06/26/22  20:42    


 


Hct  34.8 % (39.6-49.0)  L  06/26/22  20:42    


 


Plt Count  189 K/uL (152-406)   06/26/22  20:42    


 


PT  12.4 SECONDS (9.5-12.5)   06/22/22  14:14    


 


INR  1.12   06/22/22  14:14    


 


APTT  33.5 SECONDS (24.3-36.9)   06/22/22  14:14    


 


Sodium  139 mmol/L (136-145)   06/27/22  05:48    


 


Potassium  3.6 mmol/L (3.5-5.1)   06/27/22  05:48    


 


BUN  6 mg/dL (7-18)  L  06/27/22  05:48    


 


Creatinine  0.56 mg/dL (0.55-1.3)   06/27/22  05:48    


 


Glucose  145 mg/dL ()  H  06/27/22  05:48    


 


Total Bilirubin  0.3 mg/dL (0.2-1.0)   06/27/22  05:48    


 


AST  36 U/L (15-37)   06/27/22  05:48    


 


ALT  66 U/L (12-78)   06/27/22  05:48    


 


Alkaline Phosphatase  114 U/L ()   06/27/22  05:48    


 


Triglycerides  110 mg/dL (<150)   06/23/22  05:29    


 


Cholesterol  208 mg/dL (<200)  H  06/23/22  05:29    


 


HDL Cholesterol  39 mg/dL (40-60)  L  06/23/22  05:29    


 


Cholesterol/HDL Ratio  5.33   06/23/22  05:29    








Home Medications: 








Methylprednisolone [Medrol dosepack] 4 mg PO AS DIRECTED #1 adrian 06/27/22 


Quetiapine [Seroquel*] 25 mg PO BEDTIME #30 tab 06/27/22 


carBAMazepine [Tegretol*] 400 mg PO BID #120 tab 06/27/22 


Aspirin [Aspirin EC 81 MG] 81 mg PO DAILY #30 tablet. 06/28/22 


Rosuvastatin [Crestor] 5 mg PO BEDTIME #30 tab 06/28/22 


Scopolamine [Transderm-Scop] 1 each TD Q72H #10 patch.td.3 06/28/22 





New Medications: 


Aspirin [Aspirin EC 81 MG] 81 mg PO DAILY #30 tablet.


Rosuvastatin [Crestor] 5 mg PO BEDTIME #30 tab


Methylprednisolone [Medrol dosepack] 4 mg PO AS DIRECTED #1 adrian


Quetiapine [Seroquel*] 25 mg PO BEDTIME #30 tab


carBAMazepine [Tegretol*] 400 mg PO BID #120 tab


Scopolamine [Transderm-Scop] 1 each TD Q72H #10 patch.td.3


Physician Discharge Instructions: 


OK TO DC IV AND DC HOME with home health


FOLLOW-UP WITH PRIMARY CARE PROVIDER IN 1-2 WEEKS


RETURN TO THE ER IF   Symptoms worsen


CALL DR. SHEEHAN [phone] IF ANY QUESTIONS REGARDING HOSPITAL STAY.


PLEASE CALL THE FLOOR [phone] IF ANY MEDICATION OR NURSING QUESTIONS.





Diet: Regular


Activity: Fall precautions


Followup: 


NONE,NONE [Primary Care Provider] - 


Time spent managing pt's care (in minutes): 35

## 2022-06-28 NOTE — RAD REPORT
EXAM DESCRIPTION:  US - UPPER EXTREMITY VENOUS UNILATE - 6/28/2022 1:25 am

 

CLINICAL HISTORY:  Left arm pain and swelling

 

COMPARISON:  None.

 

TECHNIQUE:  Real-time sonographic evaluation of the left upper extremity deep venous systems was perf
ormed.

 

FINDINGS:  Normal compressibility, flow augmentation, phasic flow and spontaneous flow are identified
 in the left upper extremity deep venous system. No intraluminal filling defects seen. Internal jugul
ar and subclavian veins are normal as well.

 

Superficial basilic vein was unremarkable. The cephalic vein was not clearly identifiable. No finding
 to suggest thrombosis of the cephalic.

 

IMPRESSION:  No DVT in the left upper extremity.